# Patient Record
Sex: FEMALE | Race: WHITE | NOT HISPANIC OR LATINO | URBAN - NONMETROPOLITAN AREA
[De-identification: names, ages, dates, MRNs, and addresses within clinical notes are randomized per-mention and may not be internally consistent; named-entity substitution may affect disease eponyms.]

---

## 2020-09-11 ENCOUNTER — IMPORTED ENCOUNTER (OUTPATIENT)
Dept: URBAN - NONMETROPOLITAN AREA CLINIC 1 | Facility: CLINIC | Age: 55
End: 2020-09-11

## 2020-09-11 PROBLEM — H52.223: Noted: 2020-09-11

## 2020-09-11 PROBLEM — H52.4: Noted: 2020-09-11

## 2020-09-11 PROBLEM — H25.13: Noted: 2020-09-11

## 2020-09-11 PROBLEM — H52.03: Noted: 2020-09-11

## 2020-09-11 PROCEDURE — 92015 DETERMINE REFRACTIVE STATE: CPT

## 2020-09-11 PROCEDURE — 92014 COMPRE OPH EXAM EST PT 1/>: CPT

## 2020-09-11 NOTE — PATIENT DISCUSSION
Hyperopia-Discussed diagnosis with patient. Updated spec Rx given. Recommend lens that will provide comfort as well as protect safety and health of eyes. Astigmatism-Discussed diagnosis with patient. Presbyopia-Discussed diagnosis with patient. Updated spec Rx given. Recommend lens that will provide comfort as well as protect safety and health of eyes. Cataract OU-Not yet surgical. -Reviewed symptoms of advancing cataract growth such as glare and halos and decreased vision.-Continue to monitor for now. Pt will notify us if any new symptoms develop.

## 2021-06-25 ENCOUNTER — IMPORTED ENCOUNTER (OUTPATIENT)
Dept: URBAN - NONMETROPOLITAN AREA CLINIC 1 | Facility: CLINIC | Age: 56
End: 2021-06-25

## 2021-06-25 PROBLEM — H10.021: Noted: 2021-06-25

## 2021-06-25 PROCEDURE — 99213 OFFICE O/P EST LOW 20 MIN: CPT

## 2021-06-25 NOTE — PATIENT DISCUSSION
*BACTERIAL CONJUNCTIVITIS:. od-	  Discussed the acute nature and treatment options with the patient.-	  The patient was warned of the contagious nature of this infection and the use of strict hygiene to prevent spread of this condition. -	  The patient was warned to return to the office immediately if they experience loss of vision or increased pain. -	  The use of artificial tears and cool compresses were discussed with patient.-	  The patient was prescribed and instructed on the use of prescription drops. start maxitrol1 gtt qid od x 1wk

## 2021-07-06 ENCOUNTER — IMPORTED ENCOUNTER (OUTPATIENT)
Dept: URBAN - NONMETROPOLITAN AREA CLINIC 1 | Facility: CLINIC | Age: 56
End: 2021-07-06

## 2021-07-06 PROBLEM — H00.12: Noted: 2021-07-06

## 2021-07-06 PROCEDURE — 99213 OFFICE O/P EST LOW 20 MIN: CPT

## 2021-07-06 NOTE — PATIENT DISCUSSION
Chalazion: l/l-Recommend pt begin warm compresses and lid scrubs BID as well as artificial tears OU QID. Pt instructed on proper technique for lid scrubs. -If no improvement in 1-2 weeks consider sending pt to Dr Lois Barrera for eval of I&D.call for orals if needed

## 2022-04-10 ASSESSMENT — VISUAL ACUITY
OS_SC: 20/30
OD_SC: 20/40
OS_CC: J7
OD_CC: J7
OS_SC: 20/20
OD_SC: 20/20
OS_SC: 20/50
OD_SC: 20/40-1

## 2022-04-10 ASSESSMENT — TONOMETRY
OD_IOP_MMHG: 18
OD_IOP_MMHG: 17
OS_IOP_MMHG: 17
OD_IOP_MMHG: 18
OS_IOP_MMHG: 18
OS_IOP_MMHG: 17

## 2022-12-19 ENCOUNTER — ESTABLISHED PATIENT (OUTPATIENT)
Dept: RURAL CLINIC 1 | Facility: CLINIC | Age: 57
End: 2022-12-19

## 2022-12-19 PROCEDURE — 92015 DETERMINE REFRACTIVE STATE: CPT

## 2022-12-19 PROCEDURE — 92014 COMPRE OPH EXAM EST PT 1/>: CPT

## 2022-12-19 PROCEDURE — 92310-E CONTACT LENS FITTING ESTABLISH PATIENT

## 2022-12-19 ASSESSMENT — VISUAL ACUITY
OS_CC: 20/30
OS_CC: 20/40
OD_CC: 20/30
OD_CC: 20/40
OU_CC: 20/30
OU_CC: 20/30

## 2022-12-19 ASSESSMENT — TONOMETRY
OD_IOP_MMHG: 17
OS_IOP_MMHG: 17

## 2024-04-24 ENCOUNTER — ESTABLISHED PATIENT (OUTPATIENT)
Dept: RURAL CLINIC 1 | Facility: CLINIC | Age: 59
End: 2024-04-24

## 2024-04-24 DIAGNOSIS — H52.03: ICD-10-CM

## 2024-04-24 DIAGNOSIS — H52.4: ICD-10-CM

## 2024-04-24 DIAGNOSIS — H25.13: ICD-10-CM

## 2024-04-24 PROCEDURE — 92015 DETERMINE REFRACTIVE STATE: CPT

## 2024-04-24 PROCEDURE — 92014 COMPRE OPH EXAM EST PT 1/>: CPT

## 2024-04-24 ASSESSMENT — VISUAL ACUITY
OU_CC: J1
OS_CC: 20/40
OS_PH: 20/30
OD_CC: 20/25

## 2024-04-24 ASSESSMENT — TONOMETRY
OS_IOP_MMHG: 21
OD_IOP_MMHG: 21

## 2024-06-07 ENCOUNTER — EMERGENCY VISIT (OUTPATIENT)
Dept: URBAN - NONMETROPOLITAN AREA CLINIC 4 | Facility: CLINIC | Age: 59
End: 2024-06-07

## 2024-06-07 DIAGNOSIS — H52.03: ICD-10-CM

## 2024-06-07 DIAGNOSIS — H25.13: ICD-10-CM

## 2024-06-07 DIAGNOSIS — H52.4: ICD-10-CM

## 2024-06-07 DIAGNOSIS — H00.025: ICD-10-CM

## 2024-06-07 PROCEDURE — S0621AEC ROUTINE OPH EXAM INCLUDES REF/ EST PATIENT

## 2024-06-07 ASSESSMENT — VISUAL ACUITY
OD_CC: 20/25
OS_CC: 20/40
OU_CC: 20/25

## 2024-06-07 ASSESSMENT — TONOMETRY: OS_IOP_MMHG: 20

## 2024-06-13 ENCOUNTER — EMERGENCY VISIT (OUTPATIENT)
Dept: RURAL CLINIC 1 | Facility: CLINIC | Age: 59
End: 2024-06-13

## 2024-06-13 DIAGNOSIS — H00.15: ICD-10-CM

## 2024-06-13 DIAGNOSIS — H16.223: ICD-10-CM

## 2024-06-13 PROCEDURE — 67800 REMOVE EYELID LESION: CPT

## 2024-06-13 PROCEDURE — 99212 OFFICE O/P EST SF 10 MIN: CPT | Mod: 25

## 2024-06-13 ASSESSMENT — TONOMETRY: OS_IOP_MMHG: 20

## 2024-06-18 ASSESSMENT — VISUAL ACUITY: OS_CC: 20/40
